# Patient Record
Sex: MALE | Race: WHITE | HISPANIC OR LATINO | Employment: FULL TIME | URBAN - METROPOLITAN AREA
[De-identification: names, ages, dates, MRNs, and addresses within clinical notes are randomized per-mention and may not be internally consistent; named-entity substitution may affect disease eponyms.]

---

## 2017-06-21 ENCOUNTER — APPOINTMENT (OUTPATIENT)
Dept: RADIOLOGY | Facility: MEDICAL CENTER | Age: 22
End: 2017-06-21
Attending: EMERGENCY MEDICINE
Payer: COMMERCIAL

## 2017-06-21 ENCOUNTER — HOSPITAL ENCOUNTER (EMERGENCY)
Facility: MEDICAL CENTER | Age: 22
End: 2017-06-22
Attending: EMERGENCY MEDICINE
Payer: COMMERCIAL

## 2017-06-21 DIAGNOSIS — S06.0X9A CONCUSSION WITH BRIEF (LESS THAN ONE HOUR) LOSS OF CONSCIOUSNESS: ICD-10-CM

## 2017-06-21 DIAGNOSIS — S30.1XXA CONTUSION, ABDOMINAL WALL: ICD-10-CM
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
## 2017-06-21 DIAGNOSIS — S90.02XA CONTUSION OF LEFT ANKLE, INITIAL ENCOUNTER: ICD-10-CM

## 2017-06-21 DIAGNOSIS — S50.311A ABRASION OF ELBOW, RIGHT, INITIAL ENCOUNTER: ICD-10-CM

## 2017-06-21 PROBLEM — M25.521 RIGHT ELBOW PAIN: Status: ACTIVE | Noted: 2017-06-21

## 2017-06-21 PROBLEM — M25.572 LEFT ANKLE PAIN: Status: ACTIVE | Noted: 2017-06-21

## 2017-06-21 LAB
ABO GROUP BLD: NORMAL
ABO GROUP BLD: NORMAL
ALBUMIN SERPL BCP-MCNC: 4.6 G/DL (ref 3.2–4.9)
ALBUMIN/GLOB SERPL: 1.6 G/DL
ALP SERPL-CCNC: 71 U/L (ref 30–99)
ALT SERPL-CCNC: 21 U/L (ref 2–50)
ANION GAP SERPL CALC-SCNC: 13 MMOL/L (ref 0–11.9)
APTT PPP: 24.4 SEC (ref 24.7–36)
AST SERPL-CCNC: 31 U/L (ref 12–45)
BILIRUB SERPL-MCNC: 0.8 MG/DL (ref 0.1–1.5)
BLD GP AB SCN SERPL QL: NORMAL
BUN SERPL-MCNC: 21 MG/DL (ref 8–22)
CALCIUM SERPL-MCNC: 9.7 MG/DL (ref 8.5–10.5)
CHLORIDE SERPL-SCNC: 104 MMOL/L (ref 96–112)
CO2 SERPL-SCNC: 20 MMOL/L (ref 20–33)
CREAT SERPL-MCNC: 1.04 MG/DL (ref 0.5–1.4)
ERYTHROCYTE [DISTWIDTH] IN BLOOD BY AUTOMATED COUNT: 38.4 FL (ref 35.9–50)
ETHANOL BLD-MCNC: 0 G/DL
GFR SERPL CREATININE-BSD FRML MDRD: >60 ML/MIN/1.73 M 2
GLOBULIN SER CALC-MCNC: 2.8 G/DL (ref 1.9–3.5)
GLUCOSE SERPL-MCNC: 113 MG/DL (ref 65–99)
HCT VFR BLD AUTO: 46.5 % (ref 42–52)
HGB BLD-MCNC: 16.3 G/DL (ref 14–18)
INR PPP: 0.93 (ref 0.87–1.13)
MCH RBC QN AUTO: 30.4 PG (ref 27–33)
MCHC RBC AUTO-ENTMCNC: 35.1 G/DL (ref 33.7–35.3)
MCV RBC AUTO: 86.8 FL (ref 81.4–97.8)
PLATELET # BLD AUTO: 269 K/UL (ref 164–446)
PMV BLD AUTO: 8.7 FL (ref 9–12.9)
POTASSIUM SERPL-SCNC: 3.5 MMOL/L (ref 3.6–5.5)
PROT SERPL-MCNC: 7.4 G/DL (ref 6–8.2)
PROTHROMBIN TIME: 12.7 SEC (ref 12–14.6)
RBC # BLD AUTO: 5.36 M/UL (ref 4.7–6.1)
RH BLD: NORMAL
SODIUM SERPL-SCNC: 137 MMOL/L (ref 135–145)
WBC # BLD AUTO: 13.4 K/UL (ref 4.8–10.8)

## 2017-06-21 PROCEDURE — 71260 CT THORAX DX C+: CPT

## 2017-06-21 PROCEDURE — 86901 BLOOD TYPING SEROLOGIC RH(D): CPT

## 2017-06-21 PROCEDURE — 700105 HCHG RX REV CODE 258: Performed by: EMERGENCY MEDICINE

## 2017-06-21 PROCEDURE — 72170 X-RAY EXAM OF PELVIS: CPT

## 2017-06-21 PROCEDURE — 72128 CT CHEST SPINE W/O DYE: CPT

## 2017-06-21 PROCEDURE — 36415 COLL VENOUS BLD VENIPUNCTURE: CPT

## 2017-06-21 PROCEDURE — 85610 PROTHROMBIN TIME: CPT

## 2017-06-21 PROCEDURE — 70450 CT HEAD/BRAIN W/O DYE: CPT

## 2017-06-21 PROCEDURE — 305950 HCHG YELLOW TRAUMA ACT PRE-NOTIFY NO CC

## 2017-06-21 PROCEDURE — 73080 X-RAY EXAM OF ELBOW: CPT | Mod: RT

## 2017-06-21 PROCEDURE — 73600 X-RAY EXAM OF ANKLE: CPT | Mod: LT

## 2017-06-21 PROCEDURE — 86850 RBC ANTIBODY SCREEN: CPT

## 2017-06-21 PROCEDURE — 71010 DX-CHEST-LIMITED (1 VIEW): CPT

## 2017-06-21 PROCEDURE — 85027 COMPLETE CBC AUTOMATED: CPT

## 2017-06-21 PROCEDURE — 85730 THROMBOPLASTIN TIME PARTIAL: CPT

## 2017-06-21 PROCEDURE — 80053 COMPREHEN METABOLIC PANEL: CPT

## 2017-06-21 PROCEDURE — 72125 CT NECK SPINE W/O DYE: CPT

## 2017-06-21 PROCEDURE — 80307 DRUG TEST PRSMV CHEM ANLYZR: CPT

## 2017-06-21 PROCEDURE — 700117 HCHG RX CONTRAST REV CODE 255: Performed by: EMERGENCY MEDICINE

## 2017-06-21 PROCEDURE — 99285 EMERGENCY DEPT VISIT HI MDM: CPT

## 2017-06-21 PROCEDURE — 72131 CT LUMBAR SPINE W/O DYE: CPT

## 2017-06-21 PROCEDURE — 86900 BLOOD TYPING SEROLOGIC ABO: CPT

## 2017-06-21 RX ORDER — SODIUM CHLORIDE 9 MG/ML
INJECTION, SOLUTION INTRAVENOUS
Status: COMPLETED | OUTPATIENT
Start: 2017-06-21 | End: 2017-06-21

## 2017-06-21 RX ADMIN — SODIUM CHLORIDE 1000 ML: 9 INJECTION, SOLUTION INTRAVENOUS at 22:11

## 2017-06-21 RX ADMIN — IOHEXOL 100 ML: 350 INJECTION, SOLUTION INTRAVENOUS at 22:29

## 2017-06-21 ASSESSMENT — ENCOUNTER SYMPTOMS
LOSS OF CONSCIOUSNESS: 1
FALLS: 1
ABDOMINAL PAIN: 1
BACK PAIN: 0
NECK PAIN: 0
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
## 2017-06-21 NOTE — ED AVS SNAPSHOT
[employer] Access Code: [email or web address]  Expires: 7/22/2017 12:09 AM    Your email address is not on file at [facility].  Email Addresses are required for you to sign up for [employer], please contact [phone] to verify your personal information and to provide your email address prior to attempting to register for [employer].    Konstantin Camarena  No address on file    [employer]  A secure, online tool to manage your health information     [facility]’s [employer]® is a secure, online tool that connects you to your personalized health information from the privacy of your home -- day or night - making it very easy for you to manage your healthcare. Once the activation process is completed, you can even access your medical information using the [employer] angelique, which is available for free in the Apple Angelique store or Google Play store.     To learn more about [employer], visit [email or web address]    There are two levels of access available (as shown below):   My Chart Features  [facility] Primary Care Doctor [facility]  Specialists [facility]  Urgent  Care Non-[facility] Primary Care Doctor   Email your healthcare team securely and privately 24/7 X X X    Manage appointments: schedule your next appointment; view details of past/upcoming appointments X      Request prescription refills. X      View recent personal medical records, including lab and immunizations X X X X   View health record, including health history, allergies, medications X X X X   Read reports about your outpatient visits, procedures, consult and ER notes X X X X   See your discharge summary, which is a recap of your hospital and/or ER visit that includes your diagnosis, lab results, and care plan X X  X     How to register for [employer]:  Once your e-mail address has been verified, follow the following steps to sign up for [employer].     1. Go to  [email or web address]  2. Click on the Sign Up Now box, which takes you to the New Member Sign Up page. You will need to provide the  following information:  a. Enter your [employer] Access Code exactly as it appears at the top of this page. (You will not need to use this code after you’ve completed the sign-up process. If you do not sign up before the expiration date, you must request a new code.)   b. Enter your date of birth.   c. Enter your home email address.   d. Click Submit, and follow the next screen’s instructions.  3. Create a [employer] ID. This will be your [employer] login ID and cannot be changed, so think of one that is secure and easy to remember.  4. Create a [employer] password. You can change your password at any time.  5. Enter your Password Reset Question and Answer. This can be used at a later time if you forget your password.   6. Enter your e-mail address. This allows you to receive e-mail notifications when new information is available in [employer].  7. Click Sign Up. You can now view your health information.    For assistance activating your [employer] account, call [phone]

## 2017-06-22 VITALS
OXYGEN SATURATION: 96 % | BODY MASS INDEX: 28.04 KG/M2 | WEIGHT: 185 LBS | HEIGHT: 68 IN | DIASTOLIC BLOOD PRESSURE: 84 MMHG | SYSTOLIC BLOOD PRESSURE: 129 MMHG | RESPIRATION RATE: 17 BRPM | TEMPERATURE: 98.4 F | HEART RATE: 62 BPM

## 2017-06-22 RX ORDER — HYDROCODONE BITARTRATE AND ACETAMINOPHEN 5; 325 MG/1; MG/1
1-2 TABLET ORAL EVERY 6 HOURS PRN
Qty: 12 TAB | Refills: 0 | Status: SHIPPED | OUTPATIENT
Start: 2017-06-22
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
## 2017-06-22 NOTE — DISCHARGE INSTRUCTIONS
Return if you have increasing abdominal pain, chest pain, vomiting blood, blood in stool, redness around wounds, fever, or pus. Wash wounds daily with soap and water.     Concussion, Adult  A concussion is a brain injury. It is caused by:  · A hit to the head.  · A quick and sudden movement (jolt) of the head or neck.  A concussion is usually not life threatening. Even so, it can cause serious problems. If you had a concussion before, you may have concussion-like problems after a hit to your head.  HOME CARE  General Instructions  · Follow your doctor's directions carefully.  · Take medicines only as told by your doctor.  · Only take medicines your doctor says are safe.  · Do not drink alcohol until your doctor says it is okay. Alcohol and some drugs can slow down healing. They can also put you at risk for further injury.  · If you are having trouble remembering things, write them down.  · Try to do one thing at a time if you get distracted easily. For example, do not watch TV while making dinner.  · Talk to your family members or close friends when making important decisions.  · Follow up with your doctor as told.  · Watch your symptoms. Tell others to do the same. Serious problems can sometimes happen after a concussion. Older adults are more likely to have these problems.  · Tell your teachers, school nurse, school counselor, , , or  about your concussion. Tell them about what you can or cannot do. They should watch to see if:  ¨ It gets even harder for you to pay attention or concentrate.  ¨ It gets even harder for you to remember things or learn new things.  ¨ You need more time than normal to finish things.  ¨ You become annoyed (irritable) more than before.  ¨ You are not able to deal with stress as well.  ¨ You have more problems than before.  · Rest. Make sure you:  ¨ Get plenty of sleep at night.  ¨ Go to sleep early.  ¨ Go to bed at the same time every day. Try to wake up  at the same time.  ¨ Rest during the day.  ¨ Take naps when you feel tired.  · Limit activities where you have to think a lot or concentrate. These include:  ¨ Doing homework.  ¨ Doing work related to a job.  ¨ Watching TV.  ¨ Using the computer.  Returning To Your Regular Activities  Return to your normal activities slowly, not all at once. You must give your body and brain enough time to heal.   · Do not play sports or do other athletic activities until your doctor says it is okay.  · Ask your doctor when you can drive, ride a bicycle, or work other vehicles or machines. Never do these things if you feel dizzy.  · Ask your doctor about when you can return to work or school.  Preventing Another Concussion  It is very important to avoid another brain injury, especially before you have healed. In rare cases, another injury can lead to permanent brain damage, brain swelling, or death. The risk of this is greatest during the first 7-10 days after your injury. Avoid injuries by:   · Wearing a seat belt when riding in a car.  · Not drinking too much alcohol.  · Avoiding activities that could lead to a second concussion (such as contact sports).  · Wearing a helmet when doing activities like:  · Biking.  · Skiing.  · Skateboarding.  · Skating.  · Making your home safer by:  · Removing things from the floor or stairways that could make you trip.  · Using grab bars in bathrooms and handrails by stairs.  · Placing non-slip mats on floors and in bathtubs.  · Improve lighting in dark areas.  GET HELP IF:  · It gets even harder for you to pay attention or concentrate.  · It gets even harder for you to remember things or learn new things.  · You need more time than normal to finish things.  · You become annoyed (irritable) more than before.  · You are not able to deal with stress as well.  · You have more problems than before.  · You have problems keeping your balance.  · You are not able to react quickly when you should.  Get  help if you have any of these problems for more than 2 weeks:   · Lasting (chronic) headaches.  · Dizziness or trouble balancing.  · Feeling sick to your stomach (nausea).  · Seeing (vision) problems.  · Being affected by noises or light more than normal.  · Feeling sad, low, down in the dumps, blue, gloomy, or empty (depressed).  · Mood changes (mood swings).  · Feeling of fear or nervousness about what may happen (anxiety).  · Feeling annoyed.  · Memory problems.  · Problems concentrating or paying attention.  · Sleep problems.  · Feeling tired all the time.  GET HELP RIGHT AWAY IF:   · You have bad headaches or your headaches get worse.  · You have weakness (even if it is in one hand, leg, or part of the face).  · You have loss of feeling (numbness).  · You feel off balance.  · You keep throwing up (vomiting).  · You feel tired.  · One black center of your eye (pupil) is larger than the other.  · You twitch or shake violently (convulse).  · Your speech is not clear (slurred).  · You are more confused, easily angered (agitated), or annoyed than before.  · You have more trouble resting than before.  · You are unable to recognize people or places.  · You have neck pain.  · It is difficult to wake you up.  · You have unusual behavior changes.  · You pass out (lose consciousness).  MAKE SURE YOU:   · Understand these instructions.  · Will watch your condition.  · Will get help right away if you are not doing well or get worse.     This information is not intended to replace advice given to you by your health care provider. Make sure you discuss any questions you have with your health care provider.     Document Released: 12/06/2010 Document Revised: 01/08/2016 Document Reviewed: 07/10/2014  [employer] Interactive Patient Education ©2016 [employer]        Blunt Abdominal Trauma  Blunt abdominal trauma is a type of injury that involves damage to the abdominal wall or to abdominal organs, such as the liver or spleen.  The damage can involve bruising, tearing, or a rupture. This type of injury does not involve a puncture of the skin.  Blunt abdominal trauma can range from mild to severe. In some cases it can lead to a severe abdominal inflammation (peritonitis), severe bleeding, and a dangerous drop in blood pressure.  CAUSES  This injury is caused by a hard, direct hit to the abdomen. It can happen after:  · A motor vehicle accident.  · Being kicked or punched in the abdomen.  · Falling from a significant height.  RISK FACTORS  This injury is more likely to happen in people who:  · Play contact sports.  · Work in a job in which falls or injuries are more likely, such as in construction.  SYMPTOMS  The main symptom of this condition is pain in the abdomen. Other symptoms depend on the type and location of the injury. They can include:  · Abdominal pain that spreads to the the back or shoulder.  · Bruising.  · Swelling.  · Pain when pressing on the abdomen.  · Blood in the urine.  · Weakness.  · Confusion.  · Loss of consciousness.  · Pale, dusky, cool, or sweaty skin.  · Vomiting blood.  · Bloody stool or bleeding from the rectum.  · Trouble breathing.  Symptoms of this injury can develop suddenly or slowly.   DIAGNOSIS  This injury is diagnosed based on your symptoms and a physical exam. You may also have tests, including:  · Blood tests.  · Urine tests.  · Imaging tests, such as:  · A CT scan and ultrasound of your abdomen.  · X-rays of your chest and abdomen.  · A test in which a tube is used to flush your abdomen with fluid and check for blood (diagnostic peritoneal lavage).  TREATMENT  Treatment for this injury depends on its type and severity. Treatment options include:  · Observation. If the injury is mild, this may be the only treatment needed.  · Support of your blood pressure and breathing.  · Getting blood, fluids, or medicine through an IV tube.  · Antibiotic medicine.  · Insertion of tubes into the stomach or  bladder.  · A blood transfusion.  · A procedure to stop bleeding. This involves putting a long, thin tube (catheter) into one of your blood vessels (angiographic embolization).  · Surgery to open up your abdomen and control bleeding or repair damage (laparotomy). This may be done if tests suggest that you have peritonitis or bleeding that cannot be controlled with angiographic embolization.  HOME CARE INSTRUCTIONS  · Take medicines only as directed by your health care provider.  · If you were prescribed an antibiotic medicine, finish all of it even if you start to feel better.  · Follow your health care provider's instructions about diet and activity restrictions.  · Keep all follow-up visits as directed by your health care provider. This is important.  SEEK MEDICAL CARE IF:  · You continue to have abdominal pain.  · Your symptoms return.  · You develop new symptoms.  · You have blood in your urine or your bowel movements.  SEEK IMMEDIATE MEDICAL CARE IF:  · You vomit blood.  · You have heavy bleeding from your rectum.  · You have very bad abdominal pain.  · You have trouble breathing.  · You have chest pain.  · You have a fever.  · You have dizziness.  · You pass out.     This information is not intended to replace advice given to you by your health care provider. Make sure you discuss any questions you have with your health care provider.     Document Released: 01/25/2006 Document Revised: 05/03/2016 Document Reviewed: 12/09/2015  [employer] Interactive Patient Education ©2016 [employer]        Abrasion  An abrasion is a cut or scrape on the outer surface of your skin. An abrasion does not extend through all of the layers of your skin. It is important to care for your abrasion properly to prevent infection.  CAUSES  Most abrasions are caused by falling on or gliding across the ground or another surface. When your skin rubs on something, the outer and inner layer of skin rubs off.   SYMPTOMS  A cut or scrape is  the main symptom of this condition. The scrape may be bleeding, or it may appear red or pink. If there was an associated fall, there may be an underlying bruise.  DIAGNOSIS  An abrasion is diagnosed with a physical exam.  TREATMENT  Treatment for this condition depends on how large and deep the abrasion is. Usually, your abrasion will be cleaned with water and mild soap. This removes any dirt or debris that may be stuck. An antibiotic ointment may be applied to the abrasion to help prevent infection. A bandage (dressing) may be placed on the abrasion to keep it clean.  You may also need a tetanus shot.  HOME CARE INSTRUCTIONS  Medicines  · Take or apply medicines only as directed by your health care provider.  · If you were prescribed an antibiotic ointment, finish all of it even if you start to feel better.  Wound Care  · Clean the wound with mild soap and water 2-3 times per day or as directed by your health care provider. Pat your wound dry with a clean towel. Do not rub it.  · There are many different ways to close and cover a wound. Follow instructions from your health care provider about:  ¨ Wound care.  ¨ Dressing changes and removal.  · Check your wound every day for signs of infection. Watch for:  ¨ Redness, swelling, or pain.  ¨ Fluid, blood, or pus.  General Instructions  · Keep the dressing dry as directed by your health care provider. Do not take baths, swim, use a hot tub, or do anything that would put your wound underwater until your health care provider approves.  · If there is swelling, raise (elevate) the injured area above the level of your heart while you are sitting or lying down.  · Keep all follow-up visits as directed by your health care provider. This is important.  SEEK MEDICAL CARE IF:  · You received a tetanus shot and you have swelling, severe pain, redness, or bleeding at the injection site.  · Your pain is not controlled with medicine.  · You have increased redness, swelling, or pain  at the site of your wound.  SEEK IMMEDIATE MEDICAL CARE IF:  · You have a red streak going away from your wound.  · You have a fever.  · You have fluid, blood, or pus coming from your wound.  · You notice a bad smell coming from your wound or your dressing.     This information is not intended to replace advice given to you by your health care provider. Make sure you discuss any questions you have with your health care provider.     Document Released: 09/27/2006 Document Revised: 05/03/2016 Document Reviewed: 12/16/2015  [employer] Interactive Patient Education ©2016 [employer]
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
## 2017-06-26 ENCOUNTER — APPOINTMENT (OUTPATIENT)
Dept: RADIOLOGY | Facility: MEDICAL CENTER | Age: 22
End: 2017-06-26
Attending: EMERGENCY MEDICINE
Payer: COMMERCIAL

## 2017-06-26 ENCOUNTER — HOSPITAL ENCOUNTER (EMERGENCY)
Facility: MEDICAL CENTER | Age: 22
End: 2017-06-27
Attending: EMERGENCY MEDICINE
Payer: COMMERCIAL

## 2017-06-26 DIAGNOSIS — R10.30 LOWER ABDOMINAL PAIN: ICD-10-CM

## 2017-06-26 LAB
ALBUMIN SERPL BCP-MCNC: 4.2 G/DL (ref 3.2–4.9)
ALBUMIN/GLOB SERPL: 1.4 G/DL
ALP SERPL-CCNC: 53 U/L (ref 30–99)
ALT SERPL-CCNC: 32 U/L (ref 2–50)
ANION GAP SERPL CALC-SCNC: 10 MMOL/L (ref 0–11.9)
AST SERPL-CCNC: 28 U/L (ref 12–45)
BASOPHILS # BLD AUTO: 0.4 % (ref 0–1.8)
BASOPHILS # BLD: 0.03 K/UL (ref 0–0.12)
BILIRUB SERPL-MCNC: 0.4 MG/DL (ref 0.1–1.5)
BUN SERPL-MCNC: 21 MG/DL (ref 8–22)
CALCIUM SERPL-MCNC: 9.6 MG/DL (ref 8.4–10.2)
CHLORIDE SERPL-SCNC: 103 MMOL/L (ref 96–112)
CO2 SERPL-SCNC: 24 MMOL/L (ref 20–33)
CREAT SERPL-MCNC: 1.03 MG/DL (ref 0.5–1.4)
EOSINOPHIL # BLD AUTO: 0.21 K/UL (ref 0–0.51)
EOSINOPHIL NFR BLD: 2.6 % (ref 0–6.9)
ERYTHROCYTE [DISTWIDTH] IN BLOOD BY AUTOMATED COUNT: 38.8 FL (ref 35.9–50)
GFR SERPL CREATININE-BSD FRML MDRD: >60 ML/MIN/1.73 M 2
GLOBULIN SER CALC-MCNC: 3.1 G/DL (ref 1.9–3.5)
GLUCOSE SERPL-MCNC: 87 MG/DL (ref 65–99)
HCT VFR BLD AUTO: 45.1 % (ref 42–52)
HGB BLD-MCNC: 15.4 G/DL (ref 14–18)
IMM GRANULOCYTES # BLD AUTO: 0.02 K/UL (ref 0–0.11)
IMM GRANULOCYTES NFR BLD AUTO: 0.2 % (ref 0–0.9)
LYMPHOCYTES # BLD AUTO: 2.69 K/UL (ref 1–4.8)
LYMPHOCYTES NFR BLD: 32.9 % (ref 22–41)
MCH RBC QN AUTO: 30.8 PG (ref 27–33)
MCHC RBC AUTO-ENTMCNC: 34.1 G/DL (ref 33.7–35.3)
MCV RBC AUTO: 90.2 FL (ref 81.4–97.8)
MONOCYTES # BLD AUTO: 0.55 K/UL (ref 0–0.85)
MONOCYTES NFR BLD AUTO: 6.7 % (ref 0–13.4)
NEUTROPHILS # BLD AUTO: 4.68 K/UL (ref 1.82–7.42)
NEUTROPHILS NFR BLD: 57.2 % (ref 44–72)
NRBC # BLD AUTO: 0 K/UL
NRBC BLD AUTO-RTO: 0 /100 WBC
PLATELET # BLD AUTO: 257 K/UL (ref 164–446)
PMV BLD AUTO: 9.1 FL (ref 9–12.9)
POTASSIUM SERPL-SCNC: 4.1 MMOL/L (ref 3.6–5.5)
PROT SERPL-MCNC: 7.3 G/DL (ref 6–8.2)
RBC # BLD AUTO: 5 M/UL (ref 4.7–6.1)
SODIUM SERPL-SCNC: 137 MMOL/L (ref 135–145)
WBC # BLD AUTO: 8.2 K/UL (ref 4.8–10.8)

## 2017-06-26 PROCEDURE — 99284 EMERGENCY DEPT VISIT MOD MDM: CPT

## 2017-06-26 PROCEDURE — 74177 CT ABD & PELVIS W/CONTRAST: CPT

## 2017-06-26 PROCEDURE — 700111 HCHG RX REV CODE 636 W/ 250 OVERRIDE (IP): Performed by: EMERGENCY MEDICINE

## 2017-06-26 PROCEDURE — 96374 THER/PROPH/DIAG INJ IV PUSH: CPT

## 2017-06-26 PROCEDURE — 85025 COMPLETE CBC W/AUTO DIFF WBC: CPT

## 2017-06-26 PROCEDURE — 80053 COMPREHEN METABOLIC PANEL: CPT

## 2017-06-26 PROCEDURE — 700117 HCHG RX CONTRAST REV CODE 255: Performed by: EMERGENCY MEDICINE

## 2017-06-26 PROCEDURE — 96375 TX/PRO/DX INJ NEW DRUG ADDON: CPT

## 2017-06-26 PROCEDURE — 36415 COLL VENOUS BLD VENIPUNCTURE: CPT

## 2017-06-26 RX ORDER — DIPHENHYDRAMINE HYDROCHLORIDE 50 MG/ML
25 INJECTION INTRAMUSCULAR; INTRAVENOUS ONCE
Status: COMPLETED | OUTPATIENT
Start: 2017-06-26 | End: 2017-06-26

## 2017-06-26 RX ORDER — METHYLPREDNISOLONE SODIUM SUCCINATE 125 MG/2ML
125 INJECTION, POWDER, LYOPHILIZED, FOR SOLUTION INTRAMUSCULAR; INTRAVENOUS ONCE
Status: COMPLETED | OUTPATIENT
Start: 2017-06-26 | End: 2017-06-26

## 2017-06-26 RX ADMIN — METHYLPREDNISOLONE SODIUM SUCCINATE 125 MG: 125 INJECTION, POWDER, FOR SOLUTION INTRAMUSCULAR; INTRAVENOUS at 21:43

## 2017-06-26 RX ADMIN — DIPHENHYDRAMINE HYDROCHLORIDE 25 MG: 50 INJECTION, SOLUTION INTRAMUSCULAR; INTRAVENOUS at 21:43

## 2017-06-26 RX ADMIN — IOHEXOL 100 ML: 350 INJECTION, SOLUTION INTRAVENOUS at 22:08

## 2017-06-26 ASSESSMENT — PAIN SCALES - GENERAL: PAINLEVEL_OUTOF10: 6

## 2017-06-26 NOTE — ED AVS SNAPSHOT
6/27/2017    Konstantin Zamarripa  [address] [place] [address]    Dear Konstantin:    [facility] wants to ensure your discharge home is safe and you or your loved ones have had all of your questions answered regarding your care after you leave the hospital.    Below is a list of resources and contact information should you have any questions regarding your hospital stay, follow-up instructions, or active medical symptoms.    Questions or Concerns Regarding… Contact   Medical Questions Related to Your Discharge  (7 days a week, 8am-5pm) Contact a Nurse Care Coordinator   [phone]   Medical Questions Not Related to Your Discharge  (24 hours a day / 7 days a week)  Contact the Nurse Health Line   [phone]    Medications or Discharge Instructions Refer to your discharge packet   or contact your [facility] Primary Care Provider   [phone]   Follow-up Appointment(s) Schedule your appointment via Mbite   or contact Scheduling [phone]   Billing Review your statement via Mbite  or contact Billing [phone]   Medical Records Review your records via Mbite   or contact Medical Records [phone]     You may receive a telephone call within two days of discharge. This call is to make certain you understand your discharge instructions and have the opportunity to have any questions answered. You can also easily access your medical information, test results and upcoming appointments via the Mbite free online health management tool. You can learn more and sign up at [employer]/Mbite. For assistance setting up your Mbite account, please call [phone].    Once again, we want to ensure your discharge home is safe and that you have a clear understanding of any next steps in your care. If you have any questions or concerns, please do not hesitate to contact us, we are here for you. Thank you for choosing [facility] for your healthcare needs.    Sincerely,    Your [facility] Healthcare Team

## 2017-06-26 NOTE — ED AVS SNAPSHOT
Desmos Access Code: [email or web address]  Expires: 7/27/2017 12:16 AM    Your email address is not on file at [employer].  Email Addresses are required for you to sign up for Desmos, please contact [phone] to verify your personal information and to provide your email address prior to attempting to register for Desmos.    Konstantin Zamarripa  [address] [place] [address]    Panor[employer] Education[employer]  A secure, online tool to manage your health information     [employer]’s Desmos® is a secure, online tool that connects you to your personalized health information from the privacy of your home -- day or night - making it very easy for you to manage your healthcare. Once the activation process is completed, you can even access your medical information using the Desmos angelique, which is available for free in the Apple Angelique store or Google Play store.     To learn more about Desmos, visit [email or web address]    There are two levels of access available (as shown below):   My Chart Features  [facility] Primary Care Doctor [facility]  Specialists [facility]  Urgent  Care Non-[facility] Primary Care Doctor   Email your healthcare team securely and privately 24/7 X X X    Manage appointments: schedule your next appointment; view details of past/upcoming appointments X      Request prescription refills. X      View recent personal medical records, including lab and immunizations X X X X   View health record, including health history, allergies, medications X X X X   Read reports about your outpatient visits, procedures, consult and ER notes X X X X   See your discharge summary, which is a recap of your hospital and/or ER visit that includes your diagnosis, lab results, and care plan X X  X     How to register for Panorama Educationt:  Once your e-mail address has been verified, follow the following steps to sign up for Panorama Educationt.     1. Go to  [email or web address]  2. Click on the Sign Up Now box, which takes you to the New Member Sign Up page. You  will need to provide the following information:  a. Enter your "[employer] Access Code exactly as it appears at the top of this page. (You will not need to use this code after you’ve completed the sign-up process. If you do not sign up before the expiration date, you must request a new code.)   b. Enter your date of birth.   c. Enter your home email address.   d. Click Submit, and follow the next screen’s instructions.  3. Create a better.t ID. This will be your "[employer] login ID and cannot be changed, so think of one that is secure and easy to remember.  4. Create a "[employer] password. You can change your password at any time.  5. Enter your Password Reset Question and Answer. This can be used at a later time if you forget your password.   6. Enter your e-mail address. This allows you to receive e-mail notifications when new information is available in "[employer].  7. Click Sign Up. You can now view your health information.    For assistance activating your "[employer] account, call [phone]

## 2017-06-26 NOTE — ED AVS SNAPSHOT
Home Care Instructions                                                                                                                Konstantin Zamarripa   MRN: [id number]    Department:  [facility], Emergency Dept   Date of Visit:  6/26/2017            [facility], Emergency Dept    [address]    [place] [address]    Phone:  [phone]      You were seen by     Greg Burns M.D.      Your Diagnosis Was     Lower abdominal pain     R10.30       These are the medications you received during your hospitalization from 06/26/2017 1836 to 06/27/2017 0016     Date/Time Order Dose Route Action    06/26/2017 2143 diphenhydrAMINE (BENADRYL) injection 25 mg 25 mg Intravenous Given    06/26/2017 2143 methylPREDNISolone sod succ (SOLU-MEDROL) 125 MG injection 125 mg 125 mg Intravenous Given    06/26/2017 2208 iohexol (OMNIPAQUE) 350 mg/mL 100 mL Intravenous Given      Follow-up Information     1. Follow up with [facility], Emergency Dept.    Specialty:  Emergency Medicine    Why:  As needed, If symptoms worsen    Contact information    [address] [id number] [phone]      Medication Information     Review all of your home medications and newly ordered medications with your primary doctor and/or pharmacist as soon as possible. Follow medication instructions as directed by your doctor and/or pharmacist.     Please keep your complete medication list with you and share with your physician. Update the information when medications are discontinued, doses are changed, or new medications (including over-the-counter products) are added; and carry medication information at all times in the event of emergency situations.               Medication List      Notice     You have not been prescribed any medications.            Procedures and tests performed during your visit     CBC WITH DIFFERENTIAL    CMP    CONSENT FOR  CONTRAST INJECTION    CT-ABDOMEN-PELVIS WITH    ESTIMATED GFR        Discharge Instructions       Abdominal Pain (Nonspecific)  Your exam might not show the exact reason you have abdominal pain. Since there are many different causes of abdominal pain, another checkup and more tests may be needed. It is very important to follow up for lasting (persistent) or worsening symptoms. A possible cause of abdominal pain in any person who still has his or her appendix is acute appendicitis. Appendicitis is often hard to diagnose. Normal blood tests, urine tests, ultrasound, and CT scans do not completely rule out early appendicitis or other causes of abdominal pain. Sometimes, only the changes that happen over time will allow appendicitis and other causes of abdominal pain to be determined. Other potential problems that may require surgery may also take time to become more apparent. Because of this, it is important that you follow all of the instructions below.  HOME CARE INSTRUCTIONS   · Rest as much as possible.   · Do not eat solid food until your pain is gone.   · While adults or children have pain: A diet of water, weak decaffeinated tea, broth or bouillon, gelatin, oral rehydration solutions (ORS), frozen ice pops, or ice chips may be helpful.   · When pain is gone in adults or children: Start a light diet (dry toast, crackers, applesauce, or white rice). Increase the diet slowly as long as it does not bother you. Eat no dairy products (including cheese and eggs) and no spicy, fatty, fried, or high-fiber foods.   · Use no alcohol, caffeine, or cigarettes.   · Take your regular medicines unless your caregiver told you not to.   · Take any prescribed medicine as directed.   · Only take over-the-counter or prescription medicines for pain, discomfort, or fever as directed by your caregiver. Do not give aspirin to children.   If your caregiver has given you a follow-up appointment, it is very important to keep that  appointment. Not keeping the appointment could result in a permanent injury and/or lasting (chronic) pain and/or disability. If there is any problem keeping the appointment, you must call to reschedule.   SEEK IMMEDIATE MEDICAL CARE IF:   · Your pain is not gone in 24 hours.   · Your pain becomes worse, changes location, or feels different.   · You or your child has an oral temperature above 102° F (38.9° C), not controlled by medicine.   · Your baby is older than 3 months with a rectal temperature of 102° F (38.9° C) or higher.   · Your baby is 3 months old or younger with a rectal temperature of 100.4° F (38° C) or higher.   · You have shaking chills.   · You keep throwing up (vomiting) or cannot drink liquids.   · There is blood in your vomit or you see blood in your bowel movements.   · Your bowel movements become dark or black.   · You have frequent bowel movements.   · Your bowel movements stop (become blocked) or you cannot pass gas.   · You have bloody, frequent, or painful urination.   · You have yellow discoloration in the skin or whites of the eyes.   · Your stomach becomes bloated or bigger.   · You have dizziness or fainting.   · You have chest or back pain.   MAKE SURE YOU:   · Understand these instructions.   · Will watch your condition.   · Will get help right away if you are not doing well or get worse.   Document Released: 12/18/2006 Document Revised: 03/11/2013 Document Reviewed: 11/15/2010  ExitCare® Patient Information ©2013 [employer]          Patient Information     Patient Information    Following emergency treatment: all patient requiring follow-up care must return either to a private physician or a clinic if your condition worsens before you are able to obtain further medical attention, please return to the emergency room.     Billing Information    At [facility], we work to make the billing process streamlined for our patients.  Our Representatives are here to answer any questions  you may have regarding your hospital bill.  If you have insurance coverage and have supplied your insurance information to us, we will submit a claim to your insurer on your behalf.  Should you have any questions regarding your bill, we can be reached online or by phone as follows:  Online: You are able pay your bills online or live chat with our representatives about any billing questions you may have. We are here to help Monday - Friday from 8:00am to 7:30pm and 9:00am - 12:00pm on Saturdays.  Please visit [email or web address]  for more information.   Phone:  [phone] or [phone]    Please note that your emergency physician, surgeon, pathologist, radiologist, anesthesiologist, and other specialists are not employed by [facility] and will therefore bill separately for their services.  Please contact them directly for any questions concerning their bills at the numbers below:     Emergency Physician Services:  [phone]  [facility]:  [phone]  [facility]:  [phone]  [facility]:  [phone]    1. Your final bill may vary from the amount quoted upon discharge if all procedures are not complete at that time, or if your doctor has additional procedures of which we are not aware. You will receive an additional bill if you return to the Emergency Department at [facility] for suture removal regardless of the facility of which the sutures were placed.     2. Please arrange for settlement of this account at the emergency registration.    3. All self-pay accounts are due in full at the time of treatment.  If you are unable to meet this obligation then payment is expected within 4-5 days.     4. If you have had radiology studies (CT, X-ray, Ultrasound, MRI), you have received a preliminary result during your emergency department visit. Please contact the radiology department [phone] to receive a copy of your final  result. Please discuss the Final result with your primary physician or with the follow up physician provided.     Crisis Hotline:  [place] Crisis Hotline:  6-884-NPKDOCB or 1-105.225.4742  [place] Crisis Hotline:    1-418.727.9500 or 882-728-2489         ED Discharge Follow Up Questions    1. In order to provide you with very good care, we would like to follow up with a phone call in the next few days.  May we have your permission to contact you?     YES /  NO    2. What is the best phone number to call you? (       )_____-__________    3. What is the best time to call you?      Morning  /  Afternoon  /  Evening                   Patient Signature:  ____________________________________________________________    Date:  ____________________________________________________________

## 2017-06-27 VITALS
WEIGHT: 188.93 LBS | BODY MASS INDEX: 28.63 KG/M2 | TEMPERATURE: 99.1 F | SYSTOLIC BLOOD PRESSURE: 119 MMHG | DIASTOLIC BLOOD PRESSURE: 65 MMHG | OXYGEN SATURATION: 97 % | RESPIRATION RATE: 18 BRPM | HEIGHT: 68 IN | HEART RATE: 51 BPM
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
## 2017-06-27 NOTE — DISCHARGE INSTRUCTIONS
Abdominal Pain (Nonspecific)  Your exam might not show the exact reason you have abdominal pain. Since there are many different causes of abdominal pain, another checkup and more tests may be needed. It is very important to follow up for lasting (persistent) or worsening symptoms. A possible cause of abdominal pain in any person who still has his or her appendix is acute appendicitis. Appendicitis is often hard to diagnose. Normal blood tests, urine tests, ultrasound, and CT scans do not completely rule out early appendicitis or other causes of abdominal pain. Sometimes, only the changes that happen over time will allow appendicitis and other causes of abdominal pain to be determined. Other potential problems that may require surgery may also take time to become more apparent. Because of this, it is important that you follow all of the instructions below.  HOME CARE INSTRUCTIONS   · Rest as much as possible.   · Do not eat solid food until your pain is gone.   · While adults or children have pain: A diet of water, weak decaffeinated tea, broth or bouillon, gelatin, oral rehydration solutions (ORS), frozen ice pops, or ice chips may be helpful.   · When pain is gone in adults or children: Start a light diet (dry toast, crackers, applesauce, or white rice). Increase the diet slowly as long as it does not bother you. Eat no dairy products (including cheese and eggs) and no spicy, fatty, fried, or high-fiber foods.   · Use no alcohol, caffeine, or cigarettes.   · Take your regular medicines unless your caregiver told you not to.   · Take any prescribed medicine as directed.   · Only take over-the-counter or prescription medicines for pain, discomfort, or fever as directed by your caregiver. Do not give aspirin to children.   If your caregiver has given you a follow-up appointment, it is very important to keep that appointment. Not keeping the appointment could result in a permanent injury and/or lasting (chronic) pain  and/or disability. If there is any problem keeping the appointment, you must call to reschedule.   SEEK IMMEDIATE MEDICAL CARE IF:   · Your pain is not gone in 24 hours.   · Your pain becomes worse, changes location, or feels different.   · You or your child has an oral temperature above 102° F (38.9° C), not controlled by medicine.   · Your baby is older than 3 months with a rectal temperature of 102° F (38.9° C) or higher.   · Your baby is 3 months old or younger with a rectal temperature of 100.4° F (38° C) or higher.   · You have shaking chills.   · You keep throwing up (vomiting) or cannot drink liquids.   · There is blood in your vomit or you see blood in your bowel movements.   · Your bowel movements become dark or black.   · You have frequent bowel movements.   · Your bowel movements stop (become blocked) or you cannot pass gas.   · You have bloody, frequent, or painful urination.   · You have yellow discoloration in the skin or whites of the eyes.   · Your stomach becomes bloated or bigger.   · You have dizziness or fainting.   · You have chest or back pain.   MAKE SURE YOU:   · Understand these instructions.   · Will watch your condition.   · Will get help right away if you are not doing well or get worse.   Document Released: 12/18/2006 Document Revised: 03/11/2013 Document Reviewed: 11/15/2010  [employer]® Patient Information ©2013 [employer].
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
## 2021-10-29 ENCOUNTER — HOSPITAL ENCOUNTER (EMERGENCY)
Facility: MEDICAL CENTER | Age: 26
End: 2021-10-29
Attending: EMERGENCY MEDICINE

## 2021-10-29 ENCOUNTER — APPOINTMENT (OUTPATIENT)
Dept: RADIOLOGY | Facility: MEDICAL CENTER | Age: 26
End: 2021-10-29
Attending: EMERGENCY MEDICINE

## 2021-10-29 VITALS
HEIGHT: 69 IN | HEART RATE: 82 BPM | DIASTOLIC BLOOD PRESSURE: 57 MMHG | BODY MASS INDEX: 25.18 KG/M2 | TEMPERATURE: 97.2 F | RESPIRATION RATE: 16 BRPM | OXYGEN SATURATION: 96 % | SYSTOLIC BLOOD PRESSURE: 106 MMHG | WEIGHT: 170 LBS

## 2021-10-29 DIAGNOSIS — Z00.8 MEDICAL CLEARANCE FOR INCARCERATION: ICD-10-CM
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
## 2021-10-29 PROCEDURE — 70450 CT HEAD/BRAIN W/O DYE: CPT

## 2021-10-29 PROCEDURE — 307740 HCHG GREEN TRAUMA TEAM SERVICES

## 2021-10-29 PROCEDURE — 99284 EMERGENCY DEPT VISIT MOD MDM: CPT
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
## 2021-10-29 NOTE — ED NOTES
Discharge education provided. Discharge paperwork signed by pt. All questions answered. All belongings with pt. Pt ambulated with PD, independently.